# Patient Record
Sex: MALE | Race: BLACK OR AFRICAN AMERICAN | NOT HISPANIC OR LATINO | Employment: FULL TIME | URBAN - METROPOLITAN AREA
[De-identification: names, ages, dates, MRNs, and addresses within clinical notes are randomized per-mention and may not be internally consistent; named-entity substitution may affect disease eponyms.]

---
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
## 2024-02-21 PROBLEM — R94.31 ABNORMAL EKG: Status: ACTIVE | Noted: 2024-02-21

## 2024-02-21 PROBLEM — R55 SYNCOPE: Status: ACTIVE | Noted: 2024-02-21

## 2024-02-22 ENCOUNTER — OFFICE VISIT (OUTPATIENT)
Dept: PEDIATRIC CARDIOLOGY | Facility: CLINIC | Age: 19
End: 2024-02-22
Payer: MEDICAID

## 2024-02-22 ENCOUNTER — CLINICAL SUPPORT (OUTPATIENT)
Dept: PEDIATRIC CARDIOLOGY | Facility: CLINIC | Age: 19
End: 2024-02-22
Attending: PEDIATRICS
Payer: MEDICAID

## 2024-02-22 VITALS
HEART RATE: 53 BPM | DIASTOLIC BLOOD PRESSURE: 60 MMHG | HEIGHT: 71 IN | WEIGHT: 151.81 LBS | SYSTOLIC BLOOD PRESSURE: 124 MMHG | RESPIRATION RATE: 18 BRPM | OXYGEN SATURATION: 100 % | BODY MASS INDEX: 21.25 KG/M2

## 2024-02-22 DIAGNOSIS — R94.31 ABNORMAL EKG: ICD-10-CM

## 2024-02-22 DIAGNOSIS — R55 SYNCOPE, UNSPECIFIED SYNCOPE TYPE: Primary | ICD-10-CM

## 2024-02-22 DIAGNOSIS — R55 SYNCOPE: ICD-10-CM

## 2024-02-22 LAB — BSA FOR ECHO PROCEDURE: 1.86 M2

## 2024-02-22 PROCEDURE — 3044F HG A1C LEVEL LT 7.0%: CPT | Mod: CPTII,S$GLB,, | Performed by: PEDIATRICS

## 2024-02-22 PROCEDURE — 93000 ELECTROCARDIOGRAM COMPLETE: CPT | Mod: S$GLB,,, | Performed by: PEDIATRICS

## 2024-02-22 PROCEDURE — 99203 OFFICE O/P NEW LOW 30 MIN: CPT | Mod: 25,S$GLB,, | Performed by: PEDIATRICS

## 2024-02-22 PROCEDURE — 3078F DIAST BP <80 MM HG: CPT | Mod: CPTII,S$GLB,, | Performed by: PEDIATRICS

## 2024-02-22 PROCEDURE — 3008F BODY MASS INDEX DOCD: CPT | Mod: CPTII,S$GLB,, | Performed by: PEDIATRICS

## 2024-02-22 PROCEDURE — 3074F SYST BP LT 130 MM HG: CPT | Mod: CPTII,S$GLB,, | Performed by: PEDIATRICS

## 2024-02-22 PROCEDURE — 1159F MED LIST DOCD IN RCRD: CPT | Mod: CPTII,S$GLB,, | Performed by: PEDIATRICS

## 2024-02-22 PROCEDURE — 1160F RVW MEDS BY RX/DR IN RCRD: CPT | Mod: CPTII,S$GLB,, | Performed by: PEDIATRICS

## 2024-02-22 NOTE — ASSESSMENT & PLAN NOTE
In summary, Talon had a normal cardiovascular evaluation today including the EKG and echocardiogram. I do not believe that there is any significant pathology present. The electrocardiogram demonstrates early repolarization. This is considered a benign finding that would not progress to an arrhythmia of significance or require intervention.  There is no need for any special precautions, activity restrictions, or routine follow up.

## 2024-02-22 NOTE — PROGRESS NOTES
Thank you for referring your patient Talon Gresham to the Pediatric Cardiology clinic for consultation. Please review my findings below and feel free to contact for me for any questions or concerns.    Talon Gresham is a 18 y.o. male seen in clinic today for an abnormal ECG.    ASSESSMENT/PLAN:  1. Syncope, unspecified syncope type  Assessment & Plan:  Talon has complaints of pre-syncope/syncope. The cardiac evaluation today was normal including the electrocardiogram. It appears most consistent with dysautonomia or vasodepressor syncope. As you may be aware, this is typically a self-limited problem and does not put the patient at any significant clinical risks. I discussed with the family that I do not believe cardiac pathology is present. We discussed the following interventions:    - When symptoms occur sit down immediately or lie down with feet propped up  - Increase non caffeinated fluid intake to 128 oz (1 gallon) daily  - Increase salt intake  - Participate in routine exercise, specifically isometric exercise  - If no improvement or symptoms are worsening, we will discuss possible pharmacologic treatment      2. Abnormal EKG  Overview:  2/6/24 Outside facility EKG: Sinus bradycardia with sinus arrhythmia, moderate voltage criteria for LVH, may be normal variant, ST elevation, consider early repolarization, borderline EKG.     Assessment & Plan:  In summary, Talon had a normal cardiovascular evaluation today including the EKG and echocardiogram. I do not believe that there is any significant pathology present. The electrocardiogram demonstrates early repolarization. This is considered a benign finding that would not progress to an arrhythmia of significance or require intervention.  There is no need for any special precautions, activity restrictions, or routine follow up.      Preventive Medicine:  SBE prophylaxis - None indicated  Exercise - No activity restrictions    Follow Up:  Follow up if symptoms  "worsen or fail to improve.      SUBJECTIVE:  KATELYN Gresham is a 18 y.o. who was referred to me for syncope and abnormal EKG. He obtained an EKG on 2/6/2024, demonstrating sinus bradycardia with sinus arrhythmia, moderate voltage criteria for LVH, may be normal variant, ST elevation, consider early repolarization, borderline EKG. He also obtained labs, including A1c, glomerular filtration rate, TSH, T4, and CMP. He also obtained a normal EEG on 2/14/24. The EKG was obtained due to episodes in which he can hear, but cannot respond to anyone or see anything. The most frequent episode occurred last month when he stood up to throw something away and noticed everything became "bright" before he lost consciousness and fell over. He states that these episodes only last a few seconds and resolve spontaneously. He reports these episodes usually follow positional changes. There are no complaints of chest pain, shortness of breath, palpitations, decreased activity, exercise intolerance, tachycardia, or documented arrhythmias.    History reviewed. No pertinent past medical history.   History reviewed. No pertinent surgical history.  Family History   Problem Relation Age of Onset    Ovarian cysts Mother     Irregular heart beat Mother     Hypertension Father     Cancer Father         Geoblastoma    Stroke Father     Diabetes Father       There is no direct family history of congenital heart disease, hypercholesterolemia, myocardial infarction, diabetes, or other inheritable disorders.  Social History     Socioeconomic History    Marital status: Single   Tobacco Use    Smoking status: Never    Smokeless tobacco: Never   Social History Narrative    The patient lives at home with his mother. He reports smoke exposure. He is currently enrolled at [employer].     Review of patient's allergies indicates:  No Known Allergies    Current Outpatient Medications:     ondansetron (ZOFRAN-ODT) 4 MG TbDL, Take 2 tablets (8 " "mg total) by mouth every 8 (eight) hours as needed (nausea). (Patient not taking: Reported on 2024), Disp: 10 tablet, Rfl: 0    Review of Systems   A comprehensive review of symptoms was completed and negative except as noted above.    OBJECTIVE:  Vital signs  Vitals:    24 0946 24 0948   BP: 126/69 124/60   BP Location: Right arm Left leg   Patient Position: Lying Lying   BP Method: Large (Automatic) Large (Automatic)   Pulse: (!) 53    Resp: 18    SpO2: 100%    Weight: 68.9 kg (151 lb 12.8 oz)    Height: 5' 11.26" (1.81 m)       Body mass index is 21.02 kg/m².   Orthostatic Blood Pressure:  Supine: 126/69 mmHg, 49 bpm   Seated: 118/70 mmHg, 54 bpm  Standin/73 mmHg, 59 bpm  Standing (2 min): 126/74 mmHg, 61 bpm     Physical Exam  Vitals reviewed.   Constitutional:       General: He is not in acute distress.     Appearance: Normal appearance. He is normal weight. He is not ill-appearing, toxic-appearing or diaphoretic.   HENT:      Head: Normocephalic and atraumatic.      Nose: Nose normal.      Mouth/Throat:      Mouth: Mucous membranes are moist.   Cardiovascular:      Rate and Rhythm: Normal rate and regular rhythm.      Pulses: Normal pulses.           Radial pulses are 2+ on the right side.        Femoral pulses are 2+ on the right side.     Heart sounds: Normal heart sounds, S1 normal and S2 normal. No murmur heard.     No friction rub. No gallop.   Pulmonary:      Effort: Pulmonary effort is normal.      Breath sounds: Normal breath sounds.   Abdominal:      General: There is no distension.      Palpations: Abdomen is soft.      Tenderness: There is no abdominal tenderness.   Musculoskeletal:      Cervical back: Neck supple.   Skin:     General: Skin is warm and dry.      Capillary Refill: Capillary refill takes less than 2 seconds.   Neurological:      General: No focal deficit present.      Mental Status: He is alert.        Electrocardiogram:  Normal sinus rhythm with normal cardiac " intervals and early repolarization    Echocardiogram:  Grossly structurally normal intracardiac anatomy. No significant atrioventricular valve insufficiency was present. The cardiac contractility was good. The aortic arch appeared normal. No pericardial effusion was present.      Allen Bailey MD  [facility] - [address] [address]  Dept: [phone]  Dept Fax: [phone]